# Patient Record
Sex: MALE | URBAN - METROPOLITAN AREA
[De-identification: names, ages, dates, MRNs, and addresses within clinical notes are randomized per-mention and may not be internally consistent; named-entity substitution may affect disease eponyms.]

---

## 2020-02-22 ENCOUNTER — OFFICE VISIT (OUTPATIENT)
Dept: URBAN - METROPOLITAN AREA CLINIC 22 | Facility: CLINIC | Age: 35
End: 2020-02-22
Payer: COMMERCIAL

## 2020-02-22 DIAGNOSIS — H52.13 MYOPIA, BILATERAL: Primary | ICD-10-CM

## 2020-02-22 PROCEDURE — 92014 COMPRE OPH EXAM EST PT 1/>: CPT | Performed by: OPTOMETRIST

## 2020-02-22 PROCEDURE — 92310 CONTACT LENS FITTING OU: CPT | Performed by: OPTOMETRIST

## 2020-02-22 ASSESSMENT — INTRAOCULAR PRESSURE
OD: 15
OS: 15

## 2020-02-22 ASSESSMENT — VISUAL ACUITY
OS: 20/20
OD: 20/20

## 2020-02-22 ASSESSMENT — KERATOMETRY
OS: 43.12
OD: 43.69

## 2020-02-22 NOTE — IMPRESSION/PLAN
Impression: Myopia, bilateral: H52.13. Plan: New Rx given for contacts and glasses. No sleeping in contacts.

## 2023-08-07 ENCOUNTER — OFFICE VISIT (OUTPATIENT)
Dept: URBAN - METROPOLITAN AREA CLINIC 22 | Facility: CLINIC | Age: 38
End: 2023-08-07
Payer: COMMERCIAL

## 2023-08-07 DIAGNOSIS — H52.13 MYOPIA, BILATERAL: Primary | ICD-10-CM

## 2023-08-07 PROCEDURE — 92004 COMPRE OPH EXAM NEW PT 1/>: CPT | Performed by: STUDENT IN AN ORGANIZED HEALTH CARE EDUCATION/TRAINING PROGRAM

## 2023-08-07 PROCEDURE — 92310 CONTACT LENS FITTING OU: CPT | Performed by: STUDENT IN AN ORGANIZED HEALTH CARE EDUCATION/TRAINING PROGRAM

## 2023-08-07 ASSESSMENT — VISUAL ACUITY
OS: 20/20
OD: 20/20

## 2023-08-07 ASSESSMENT — INTRAOCULAR PRESSURE
OS: 13
OD: 11

## 2025-06-02 ENCOUNTER — OFFICE VISIT (OUTPATIENT)
Dept: URBAN - METROPOLITAN AREA CLINIC 22 | Facility: CLINIC | Age: 40
End: 2025-06-02
Payer: COMMERCIAL

## 2025-06-02 DIAGNOSIS — H52.13 MYOPIA, BILATERAL: Primary | ICD-10-CM

## 2025-06-02 PROCEDURE — 92014 COMPRE OPH EXAM EST PT 1/>: CPT

## 2025-06-02 PROCEDURE — 92310 CONTACT LENS FITTING OU: CPT

## 2025-06-02 ASSESSMENT — INTRAOCULAR PRESSURE
OD: 12
OS: 11

## 2025-06-02 ASSESSMENT — VISUAL ACUITY
OS: 20/20
OD: 20/20

## 2025-06-02 ASSESSMENT — KERATOMETRY
OS: 43.50
OD: 44.25